# Patient Record
Sex: MALE | Race: WHITE | NOT HISPANIC OR LATINO | Employment: FULL TIME | URBAN - METROPOLITAN AREA
[De-identification: names, ages, dates, MRNs, and addresses within clinical notes are randomized per-mention and may not be internally consistent; named-entity substitution may affect disease eponyms.]

---

## 2020-06-12 ENCOUNTER — APPOINTMENT (OUTPATIENT)
Dept: RADIOLOGY | Facility: IMAGING CENTER | Age: 35
End: 2020-06-12
Attending: NURSE PRACTITIONER
Payer: COMMERCIAL

## 2020-06-12 ENCOUNTER — HOSPITAL ENCOUNTER (OUTPATIENT)
Facility: MEDICAL CENTER | Age: 35
End: 2020-06-12
Attending: NURSE PRACTITIONER
Payer: COMMERCIAL

## 2020-06-12 ENCOUNTER — OFFICE VISIT (OUTPATIENT)
Dept: URGENT CARE | Facility: CLINIC | Age: 35
End: 2020-06-12
Payer: COMMERCIAL

## 2020-06-12 VITALS
TEMPERATURE: 98 F | HEART RATE: 68 BPM | DIASTOLIC BLOOD PRESSURE: 80 MMHG | HEIGHT: 69 IN | SYSTOLIC BLOOD PRESSURE: 122 MMHG | OXYGEN SATURATION: 99 % | WEIGHT: 165 LBS | BODY MASS INDEX: 24.44 KG/M2 | RESPIRATION RATE: 16 BRPM

## 2020-06-12 DIAGNOSIS — R06.02 SHORTNESS OF BREATH: ICD-10-CM

## 2020-06-12 DIAGNOSIS — R05.9 COUGH: ICD-10-CM

## 2020-06-12 LAB — COVID ORDER STATUS COVID19: NORMAL

## 2020-06-12 PROCEDURE — 71046 X-RAY EXAM CHEST 2 VIEWS: CPT | Mod: TC | Performed by: NURSE PRACTITIONER

## 2020-06-12 PROCEDURE — 99204 OFFICE O/P NEW MOD 45 MIN: CPT | Performed by: NURSE PRACTITIONER

## 2020-06-12 RX ORDER — ALBUTEROL SULFATE 90 UG/1
2 AEROSOL, METERED RESPIRATORY (INHALATION) EVERY 6 HOURS PRN
Qty: 8.5 G | Refills: 0 | Status: SHIPPED | OUTPATIENT
Start: 2020-06-12 | End: 2021-12-31

## 2020-06-12 RX ORDER — BENZONATATE 100 MG/1
100 CAPSULE ORAL 3 TIMES DAILY PRN
Qty: 30 CAP | Refills: 0 | Status: SHIPPED | OUTPATIENT
Start: 2020-06-12 | End: 2020-07-06

## 2020-06-12 NOTE — PROGRESS NOTES
Chief Complaint   Patient presents with   • Shortness of Breath     x3 weeks, chest congestion and tighness, mild cough, unable to sleep, no fevers       HISTORY OF PRESENT ILLNESS: Patient is a 34 y.o. male who presents to urgent care today with complaints of shortness of breath for the past 3 weeks.  Admits to associated chest congestion, mild cough.  His symptoms seem to be worse at night.  He denies any chest pain, fever, chills, sore throat, malaise.  He does admit to history of seasonal allergies does not take any medication for such.  Denies any known exposure to COVID.    There are no active problems to display for this patient.      Allergies:Patient has no known allergies.    No current [facility]-ordered outpatient medications on file.     No current [facility]-ordered facility-administered medications on file.        History reviewed. No pertinent past medical history.    Social History     Tobacco Use   • Smoking status: Former Smoker     Types: Cigarettes     Last attempt to quit: 2015     Years since quittin.4   • Smokeless tobacco: Never Used   Substance Use Topics   • Alcohol use: Not on file   • Drug use: Yes     Frequency: 3.0 times per week     Types: Marijuana       No family status information on file.   History reviewed. No pertinent family history.    ROS:  Review of Systems   Constitutional: Negative for fever, chills, weight loss, malaise, and fatigue.   HENT: Negative for ear pain, nosebleeds, congestion, sore throat and neck pain.    Eyes: Negative for vision changes.   Neuro: Negative for headache, sensory changes, weakness, seizure, LOC.   Cardiovascular: Negative for chest pain, palpitations, orthopnea and leg swelling.   Respiratory: Positive for cough, chest congestion, shortness of breath.     Gastrointestinal: Negative for abdominal pain, nausea, vomiting or diarrhea.   Genitourinary: Negative for dysuria, urgency and frequency.  Musculoskeletal: Negative for falls, neck pain, back  "pain, joint pain, myalgias.   Skin: Negative for rash, diaphoresis.     Exam:  /80 (Patient Position: Sitting)   Pulse 68   Temp 36.7 °C (98 °F) (Temporal)   Resp 16   Ht 1.753 m (5' 9\")   Wt 74.8 kg (165 lb)   SpO2 99%   General: well-nourished, well-developed male in NAD  Head: normocephalic, atraumatic  Eyes: PERRLA, no conjunctival injection, acuity grossly intact, lids normal.  Ears: normal shape and symmetry, no tenderness, no discharge. External canals are without any significant edema or erythema. Tympanic membranes are without any inflammation, no effusion. Gross auditory acuity is intact.  Nose: symmetrical without tenderness, no discharge.  Mouth/Throat: reasonable hygiene, no erythema, exudates or tonsillar enlargement.  Neck: no masses, range of motion within normal limits, no tracheal deviation. No obvious thyroid enlargement.   Lymph: no cervical adenopathy. No supraclavicular adenopathy.   Neuro: alert and oriented. Cranial nerves 1-12 grossly intact. No sensory deficit.   Cardiovascular: regular rate and rhythm. No edema.  Pulmonary: no distress. Chest is symmetrical with respiration, no wheezes, crackles, or rhonchi.   Musculoskeletal: no clubbing, appropriate muscle tone, gait is stable.  Skin: warm, dry, intact, no clubbing, no cyanosis, no rashes.   Psych: appropriate mood, affect, judgement.     DX chest radiology reading \"No acute cardiopulmonary abnormality.\"        Assessment/Plan:  1. Shortness of breath  DX-CHEST-2 VIEWS    COVID/SARS COV-2 PCR    albuterol 108 (90 Base) MCG/ACT Aero Soln inhalation aerosol   2. Cough  benzonatate (TESSALON) 100 MG Cap         Patient presents with complaints of shortness of breath and cough for 3 weeks.  His chest x-ray is negative today.  I suspect his symptoms are allergy related, he is instructed to take a daily allergy medication.  Albuterol and Tessalon as needed.  Due to length of symptoms and complaints, we will also test for COVID.  " Self quarantine instructed until he is notified of results.  Supportive care, differential diagnoses, and indications for immediate follow-up discussed with patient.   Pathogenesis of diagnosis discussed including typical length and natural progression.   Instructed to return to clinic or nearest emergency department for any change in condition, further concerns, or worsening of symptoms.  Patient states understanding of the plan of care and discharge instructions.  Instructed to make an appointment, for follow up, with his primary care provider.        Please note that this dictation was created using voice recognition software. I have made every reasonable attempt to correct obvious errors, but I expect that there are errors of grammar and possibly content that I did not discover before finalizing the note.      CLYDE Pace.

## 2020-06-16 LAB
SARS-COV-2 RNA RESP QL NAA+PROBE: NOT DETECTED
SPECIMEN SOURCE: NORMAL

## 2020-07-06 ENCOUNTER — OFFICE VISIT (OUTPATIENT)
Dept: URGENT CARE | Facility: CLINIC | Age: 35
End: 2020-07-06
Payer: COMMERCIAL

## 2020-07-06 VITALS
RESPIRATION RATE: 16 BRPM | HEIGHT: 69 IN | SYSTOLIC BLOOD PRESSURE: 112 MMHG | WEIGHT: 165 LBS | HEART RATE: 61 BPM | DIASTOLIC BLOOD PRESSURE: 58 MMHG | BODY MASS INDEX: 24.44 KG/M2 | TEMPERATURE: 98.3 F | OXYGEN SATURATION: 97 %

## 2020-07-06 DIAGNOSIS — J32.9 RHINOSINUSITIS: ICD-10-CM

## 2020-07-06 PROCEDURE — 99214 OFFICE O/P EST MOD 30 MIN: CPT | Performed by: FAMILY MEDICINE

## 2020-07-06 RX ORDER — PREDNISONE 20 MG/1
40 TABLET ORAL EVERY MORNING
Qty: 12 TAB | Refills: 0 | Status: SHIPPED | OUTPATIENT
Start: 2020-07-06 | End: 2020-07-12

## 2020-07-06 RX ORDER — DOXYCYCLINE HYCLATE 100 MG
100 TABLET ORAL EVERY 12 HOURS
Qty: 20 TAB | Refills: 0 | Status: SHIPPED | OUTPATIENT
Start: 2020-07-06 | End: 2020-07-16

## 2020-07-06 ASSESSMENT — ENCOUNTER SYMPTOMS
COUGH: 1
SINUS PAIN: 1

## 2020-07-06 NOTE — PROGRESS NOTES
"Subjective:      Daniel Sebastian is a 34 y.o. male who presents with Nasal Congestion (1.5 months, congestion/ mucus, seen in june, not better)    - This is a pleasant and non toxic appearing 34 y.o. male with c/o 5-6 wks w/ sinus pain/pressure and nasal DC. No NVFC. Feels a lot of PND. No cp/sob, occasional cough but he says this is to clear the PND            ALLERGIES:  Patient has no known allergies.     PMH:  History reviewed. No pertinent past medical history.     PSH:  History reviewed. No pertinent surgical history.    MEDS:    Current Outpatient Medications:   •  predniSONE (DELTASONE) 20 MG Tab, Take 2 Tabs by mouth every morning for 6 days., Disp: 12 Tab, Rfl: 0  •  doxycycline (VIBRAMYCIN) 100 MG Tab, Take 1 Tab by mouth every 12 hours for 10 days., Disp: 20 Tab, Rfl: 0  •  albuterol 108 (90 Base) MCG/ACT Aero Soln inhalation aerosol, Inhale 2 Puffs by mouth every 6 hours as needed for Shortness of Breath., Disp: 8.5 g, Rfl: 0    ** I have documented what I find to be significant in regards to past medical, social, family and surgical history  in my HPI or under PMH/PSH/FH review section, otherwise it is contributory **             HPI    Review of Systems   HENT: Positive for congestion and sinus pain.    Respiratory: Positive for cough.    All other systems reviewed and are negative.         Objective:     /58   Pulse 61   Temp 36.8 °C (98.3 °F) (Temporal)   Resp 16   Ht 1.753 m (5' 9\")   Wt 74.8 kg (165 lb)   SpO2 97%   BMI 24.37 kg/m²      Physical Exam  Vitals signs and nursing note reviewed.   Constitutional:       General: He is not in acute distress.     Appearance: He is well-developed. He is not diaphoretic.   HENT:      Head: Normocephalic and atraumatic.      Nose: Congestion and rhinorrhea present.      Mouth/Throat:      Mouth: Mucous membranes are moist.      Pharynx: Oropharyngeal exudate and posterior oropharyngeal erythema present.   Eyes:      General: No scleral icterus.     " Conjunctiva/sclera: Conjunctivae normal.   Cardiovascular:      Heart sounds: Normal heart sounds. No murmur.   Pulmonary:      Effort: Pulmonary effort is normal. No respiratory distress.      Breath sounds: Normal breath sounds.   Skin:     Coloration: Skin is not pale.      Findings: No rash.   Neurological:      Mental Status: He is alert.      Motor: No abnormal muscle tone.   Psychiatric:         Mood and Affect: Mood normal.         Behavior: Behavior normal.         Judgment: Judgment normal.                 Assessment/Plan:           1. Rhinosinusitis  predniSONE (DELTASONE) 20 MG Tab    doxycycline (VIBRAMYCIN) 100 MG Tab       - rest  - E.R. precautions discussed     Dx & d/c instructions discussed w/ patient and/or family members.     Follow up with PCP (or UC if PCP is unavailable) in 2-3 days to make sure improving and no further additional treatment needed, ER if not improving or feeling/getting worse.    Any realistic and/or common medication side effects that may have been given today(i.e. Rash, GI upset/constipation, sedation, elevation of BP or blood sugars) reviewed.     Patient left in stable condition      reviewed if narcotics given

## 2020-08-18 ENCOUNTER — OFFICE VISIT (OUTPATIENT)
Dept: URGENT CARE | Facility: CLINIC | Age: 35
End: 2020-08-18
Payer: COMMERCIAL

## 2020-08-18 VITALS
HEART RATE: 95 BPM | HEIGHT: 69 IN | WEIGHT: 160 LBS | DIASTOLIC BLOOD PRESSURE: 66 MMHG | OXYGEN SATURATION: 99 % | TEMPERATURE: 98.4 F | SYSTOLIC BLOOD PRESSURE: 120 MMHG | BODY MASS INDEX: 23.7 KG/M2

## 2020-08-18 DIAGNOSIS — L74.0 HEAT RASH: ICD-10-CM

## 2020-08-18 PROCEDURE — 99214 OFFICE O/P EST MOD 30 MIN: CPT | Performed by: NURSE PRACTITIONER

## 2020-08-18 RX ORDER — TRIAMCINOLONE ACETONIDE 1 MG/G
1 CREAM TOPICAL 2 TIMES DAILY
Qty: 15 G | Refills: 0 | Status: SHIPPED | OUTPATIENT
Start: 2020-08-18 | End: 2020-08-25

## 2020-08-18 ASSESSMENT — ENCOUNTER SYMPTOMS
FATIGUE: 0
FEVER: 0

## 2020-08-19 NOTE — PROGRESS NOTES
Subjective:     Daniel Sebastian is a 34 y.o. male who presents for Rash (both thighs, itches, noticed today)      Rash to inner thighs. Noticed it after his run today, possibly chaffed. Concerns for possible STD due to proximity. No history of similar rash. No new soaps or detergents. No pain. Applied a moisturizer cream. Possible hx of eczema, dry skin and red bumps to extremities. Hx of molluscum. Recently moved here, does not have a current PCP.     Rash  This is a new problem. The current episode started today. The problem is unchanged. The affected locations include the groin. The rash is characterized by redness and itchiness. He was exposed to nothing. Pertinent negatives include no fatigue, fever or joint pain. Past treatments include moisturizer. The treatment provided no relief.       History reviewed. No pertinent past medical history.    History reviewed. No pertinent surgical history.    Social History     Socioeconomic History   • Marital status: Single     Spouse name: Not on file   • Number of children: Not on file   • Years of education: Not on file   • Highest education level: Not on file   Occupational History   • Not on file   Social Needs   • Financial resource strain: Not on file   • Food insecurity     Worry: Not on file     Inability: Not on file   • Transportation needs     Medical: Not on file     Non-medical: Not on file   Tobacco Use   • Smoking status: Former Smoker     Types: Cigarettes     Quit date: 2015     Years since quittin.6   • Smokeless tobacco: Never Used   Substance and Sexual Activity   • Alcohol use: Yes     Comment: social   • Drug use: Yes     Frequency: 3.0 times per week     Types: Marijuana   • Sexual activity: Not on file   Lifestyle   • Physical activity     Days per week: Not on file     Minutes per session: Not on file   • Stress: Not on file   Relationships   • Social connections     Talks on phone: Not on file     Gets together: Not on file     Attends  "Confucianist service: Not on file     Active member of club or organization: Not on file     Attends meetings of clubs or organizations: Not on file     Relationship status: Not on file   • Intimate partner violence     Fear of current or ex partner: Not on file     Emotionally abused: Not on file     Physically abused: Not on file     Forced sexual activity: Not on file   Other Topics Concern   • Not on file   Social History Narrative   • Not on file        History reviewed. No pertinent family history.     No Known Allergies    Review of Systems   Constitutional: Negative for fatigue and fever.   Genitourinary: Negative for dysuria.   Musculoskeletal: Negative for joint pain.   Skin: Positive for itching and rash.   All other systems reviewed and are negative.       Objective:   /66   Pulse 95   Temp 36.9 °C (98.4 °F) (Temporal)   Ht 1.753 m (5' 9\")   Wt 72.6 kg (160 lb)   SpO2 99%   BMI 23.63 kg/m²     Physical Exam  Vitals signs reviewed.   Constitutional:       General: He is not in acute distress.     Appearance: He is well-developed.   HENT:      Head: Normocephalic and atraumatic.      Right Ear: External ear normal.      Left Ear: External ear normal.      Nose: Nose normal.   Eyes:      Conjunctiva/sclera: Conjunctivae normal.   Neck:      Musculoskeletal: Normal range of motion.   Cardiovascular:      Rate and Rhythm: Normal rate.   Pulmonary:      Effort: Pulmonary effort is normal.   Genitourinary:     Penis: Normal. No erythema, discharge, swelling or lesions.    Musculoskeletal: Normal range of motion.   Skin:     General: Skin is warm and dry.      Findings: Rash present. Rash is macular. Rash is not pustular or vesicular.             Comments: Small macular erythremic rash to proximal/medial bilateral thighs, same area bilaterally, approx 3 cm.    Neurological:      Mental Status: He is alert and oriented to person, place, and time.      GCS: GCS eye subscore is 4. GCS verbal subscore is 5. " GCS motor subscore is 6.   Psychiatric:         Speech: Speech normal.         Behavior: Behavior normal.         Thought Content: Thought content normal.         Judgment: Judgment normal.         Assessment/Plan:   1. Heat rash  - triamcinolone acetonide (KENALOG) 0.1 % Cream; Apply 1 Application to affected area(s) 2 times a day for 7 days.  Dispense: 15 g; Refill: 0  - REFERRAL TO FOLLOW-UP WITH PRIMARY CARE  -Wear breathable clothing (such as cotton) that does not occlude the skin.  -Keep area dry and cool.  -Monitor for changes or signs of infection.    Discussed heat rash vs contact dermatitis. Presentation not consistent with an STD. Monitor for progression of rash. Discussed applying a topical corticosteroid to decrease inflammation.     Differential diagnosis, natural history, supportive care, and indications for immediate follow-up discussed.

## 2020-08-19 NOTE — PATIENT INSTRUCTIONS
Heat Rash, Adult    Heat rash is an itchy rash of little red bumps that often occurs during hot, humid weather. Heat rash is also called prickly heat or miliaria.  Heat rash usually affects:  · Armpits.  · Elbows.  · Groin.  · Neck.  · The area underneath the breasts.  · Shoulders.  · Chest.  What are the causes?  This condition is caused by blocked sweat ducts. When sweat is trapped under the skin, it spreads into surrounding tissues and causes a rash of red bumps.  What increases the risk?  This condition is more likely to develop in people who:  · Are overdressed in hot, humid weather.  · Wear clothing that rubs against the skin.  · Are active in hot, humid weather.  · Sweat a lot.  · Are not used to hot, humid weather.  What are the signs or symptoms?  Symptoms of this condition include:  · Small red bumps that are itchy or prickly.  · Very little sweating or no sweating in the affected area.  How is this diagnosed?  This condition is diagnosed based on your symptoms and medical history, as well as a physical exam.  How is this treated?  Moving to a cool, dry place is the best treatment for heat rash. Treatment may also include medicines, such as:  · Corticosteroid creams for skin irritation.  · Antibiotic medicines, if the rash becomes infected.  Follow these instructions at home:  Skin care  · Keep the affected area dry.  · Do not apply ointments or creams that contain mineral oil or petroleum ingredients to your skin. These can make the condition worse.  · Apply cool compresses to the affected areas.  · Do not scratch your skin.  · Do not take hot showers or baths.  General instructions  · Take over-the-counter and prescription medicines only as told by your health care provider.  · If you were prescribed an antibiotic, take it as told by your health care provider. Do not stop taking it even if your condition improves.  · Stay in a cool room as much as possible. Use an air conditioner or fan, if  possible.  · Do not wear tight clothes. Wear comfortable, loose-fitting clothing.  · Keep all follow-up visits as told by your health care provider. This is important.  Contact a health care provider if:  · You have a fever.  · Your rash does not go away after 3-4 days.  · Your rash gets worse or it is very itchy.  · Your rash has pus or fluid coming from it.  Get help right away if:  · You are dizzy or nauseated.  · You feel confused.  · You have trouble breathing.  · You have chest pain.  · You have muscle cramps or contractions.  · You faint.  Summary  · Heat rash is an itchy rash of little red bumps that often occurs during hot, humid weather.  · Symptoms of heat rash include small red bumps that are itchy or prickly and very little or no sweating in the affected area.  · This condition is diagnosed based on your symptoms and medical history, as well as a physical exam.  · Moving to a cool, dry place is the best treatment for heat rash.  · Do not wear tight clothes. Wear comfortable, loose-fitting clothing.  This information is not intended to replace advice given to you by your health care provider. Make sure you discuss any questions you have with your health care provider.  Document Released: 12/06/2010 Document Revised: 11/30/2018 Document Reviewed: 02/28/2018  [employer] Patient Education © 2020 [employer]    Contact Dermatitis  Dermatitis is redness, soreness, and swelling (inflammation) of the skin. Contact dermatitis is a reaction to certain substances that touch the skin. Many different substances can cause contact dermatitis. There are two types of contact dermatitis:  · Irritant contact dermatitis. This type is caused by something that irritates your skin, such as having dry hands from washing them too often with soap. This type does not require previous exposure to the substance for a reaction to occur. This is the most common type.  · Allergic contact dermatitis. This type is caused by a substance  that you are allergic to, such as poison ivy. This type occurs when you have been exposed to the substance (allergen) and develop a sensitivity to it. Dermatitis may develop soon after your first exposure to the allergen, or it may not develop until the next time you are exposed and every time thereafter.  What are the causes?  Irritant contact dermatitis is most commonly caused by exposure to:  · Makeup.  · Soaps.  · Detergents.  · Bleaches.  · Acids.  · Metal salts, such as nickel.  Allergic contact dermatitis is most commonly caused by exposure to:  · Poisonous plants.  · Chemicals.  · Jewelry.  · Latex.  · Medicines.  · Preservatives in products, such as clothing.  What increases the risk?  You are more likely to develop this condition if you have:  · A job that exposes you to irritants or allergens.  · Certain medical conditions, such as asthma or eczema.  What are the signs or symptoms?  Symptoms of this condition may occur on your body anywhere the irritant has touched you or is touched by you.  · Symptoms include:  ? Dryness or flaking.  ? Redness.  ? Cracks.  ? Itching.  ? Pain or a burning feeling.  ? Blisters.  ? Drainage of small amounts of blood or clear fluid from skin cracks.  With allergic contact dermatitis, there may also be swelling in areas such as the eyelids, mouth, or genitals.  How is this diagnosed?  This condition is diagnosed with a medical history and physical exam.  · A patch skin test may be performed to help determine the cause.  · If the condition is related to your job, you may need to see an occupational medicine specialist.  How is this treated?  This condition is treated by checking for the cause of the reaction and protecting your skin from further contact. Treatment may also include:  · Steroid creams or ointments. Oral steroid medicines may be needed in more severe cases.  · Antibiotic medicines or antibacterial ointments, if a skin infection is present.  · Antihistamine lotion  or an antihistamine taken by mouth to ease itching.  · A bandage (dressing).  Follow these instructions at home:  Skin care  · Moisturize your skin as needed.  · Apply cool compresses to the affected areas.  · Try applying baking soda paste to your skin. Stir water into baking soda until it reaches a paste-like consistency.  · Do not scratch your skin, and avoid friction to the affected area.  · Avoid the use of soaps, perfumes, and dyes.  Medicines  · Take or apply over-the-counter and prescription medicines only as told by your health care provider.  · If you were prescribed an antibiotic medicine, take or apply the antibiotic as told by your health care provider. Do not stop using the antibiotic even if your condition improves.  Bathing  · Try taking a bath with:  ? Epsom salts. Follow the instructions on the packaging. You can get these at your local pharmacy or grocery store.  ? Baking soda. Pour a small amount into the bath as directed by your health care provider.  ? Colloidal oatmeal. Follow the instructions on the packaging. You can get this at your local pharmacy or grocery store.  · Bathe less frequently, such as every other day.  · Bathe in lukewarm water. Avoid using hot water.  Bandage care  · If you were given a bandage (dressing), change it as told by your health care provider.  · Wash your hands with soap and water before and after you change your dressing. If soap and water are not available, use hand .  General instructions  · Avoid the substance that caused your reaction. If you do not know what caused it, keep a journal to try to track what caused it. Write down:  ? What you eat.  ? What cosmetic products you use.  ? What you drink.  ? What you wear in the affected area. This includes jewelry.  · Check the affected areas every day for signs of infection. Check for:  ? More redness, swelling, or pain.  ? More fluid or blood.  ? Warmth.  ? Pus or a bad smell.  · Keep all follow-up visits  as told by your health care provider. This is important.  Contact a health care provider if:  · Your condition does not improve with treatment.  · Your condition gets worse.  · You have signs of infection such as swelling, tenderness, redness, soreness, or warmth in the affected area.  · You have a fever.  · You have new symptoms.  Get help right away if:  · You have a severe headache, neck pain, or neck stiffness.  · You vomit.  · You feel very sleepy.  · You notice red streaks coming from the affected area.  · Your bone or joint underneath the affected area becomes painful after the skin has healed.  · The affected area turns darker.  · You have difficulty breathing.  Summary  · Dermatitis is redness, soreness, and swelling (inflammation) of the skin. Contact dermatitis is a reaction to certain substances that touch the skin.  · Symptoms of this condition may occur on your body anywhere the irritant has touched you or is touched by you.  · This condition is treated by figuring out what caused the reaction and protecting your skin from further contact. Treatment may also include medicines and skin care.  · Avoid the substance that caused your reaction. If you do not know what caused it, keep a journal to try to track what caused it.  · Contact a health care provider if your condition gets worse or you have signs of infection such as swelling, tenderness, redness, soreness, or warmth in the affected area.  This information is not intended to replace advice given to you by your health care provider. Make sure you discuss any questions you have with your health care provider.  Document Released: 12/15/2001 Document Revised: 04/08/2020 Document Reviewed: 07/03/2019  [employer] Patient Education © 2020 [employer]

## 2020-08-28 ENCOUNTER — OFFICE VISIT (OUTPATIENT)
Dept: URGENT CARE | Facility: CLINIC | Age: 35
End: 2020-08-28
Payer: COMMERCIAL

## 2020-08-28 VITALS
BODY MASS INDEX: 23.7 KG/M2 | HEIGHT: 69 IN | OXYGEN SATURATION: 94 % | DIASTOLIC BLOOD PRESSURE: 74 MMHG | RESPIRATION RATE: 15 BRPM | HEART RATE: 64 BPM | WEIGHT: 160 LBS | TEMPERATURE: 96.8 F | SYSTOLIC BLOOD PRESSURE: 124 MMHG

## 2020-08-28 DIAGNOSIS — A64 STI (SEXUALLY TRANSMITTED INFECTION): ICD-10-CM

## 2020-08-28 PROCEDURE — 99214 OFFICE O/P EST MOD 30 MIN: CPT | Performed by: PHYSICIAN ASSISTANT

## 2020-08-28 ASSESSMENT — ENCOUNTER SYMPTOMS
FATIGUE: 0
ABDOMINAL PAIN: 0
FLANK PAIN: 0
EYE REDNESS: 0
CHILLS: 0
SWOLLEN GLANDS: 0
MYALGIAS: 0
SORE THROAT: 0
FEVER: 0
EYE DISCHARGE: 0
HEADACHES: 0
NAUSEA: 0
JOINT SWELLING: 0

## 2020-08-28 NOTE — PROGRESS NOTES
"Subjective:   Daniel Sebastian is a 34 y.o. male who presents for Exposure to STD (heat rash,but mainly concern of the std,  no symptoms just want to get checked )      Sexually Transmitted Diseases  Chronicity: Patient recently had unprotected sex with a new partner.  He currently does not have any signs or symptoms of an STD but would like to be tested. Pertinent negatives include no abdominal pain, chills, congestion, fatigue, fever, headaches, joint swelling, myalgias, nausea, rash, sore throat, swollen glands or urinary symptoms.         Review of Systems   Constitutional: Negative for chills, fatigue, fever and malaise/fatigue.   HENT: Negative for congestion and sore throat.    Eyes: Negative for discharge and redness.   Gastrointestinal: Negative for abdominal pain and nausea.   Genitourinary: Negative for dysuria, flank pain, frequency, hematuria and urgency.        Denies any penile discharge or rashes or lesions.   Musculoskeletal: Negative for joint swelling and myalgias.   Skin: Negative for rash.   Neurological: Negative for headaches.       Medications:    • albuterol Aers    Allergies: Patient has no known allergies.    Problem List: Daniel Sebastian does not have a problem list on file.    Surgical History:  No past surgical history on file.    Past Social Hx: Daniel Sebastian  reports that he quit smoking about 5 years ago. His smoking use included cigarettes. He has never used smokeless tobacco. He reports current alcohol use. He reports current drug use. Frequency: 3.00 times per week. Drug: Marijuana.     Past Family Hx:  Daniel Sebastian family history is not on file.     Problem list, medications, and allergies reviewed by myself today in Epic.     Objective:     /74   Pulse 64   Temp 36 °C (96.8 °F) (Temporal)   Resp 15   Ht 1.753 m (5' 9\")   Wt 72.6 kg (160 lb)   SpO2 94%   BMI 23.63 kg/m²     Physical Exam  Constitutional:       General: He is not in acute distress.     Appearance: Normal appearance. " He is not ill-appearing, toxic-appearing or diaphoretic.   HENT:      Head: Normocephalic and atraumatic.      Right Ear: Tympanic membrane, ear canal and external ear normal.      Left Ear: Tympanic membrane, ear canal and external ear normal.      Nose: Nose normal. No congestion or rhinorrhea.      Mouth/Throat:      Mouth: Mucous membranes are moist.      Pharynx: No oropharyngeal exudate or posterior oropharyngeal erythema.   Eyes:      Conjunctiva/sclera: Conjunctivae normal.   Neck:      Musculoskeletal: Normal range of motion. No muscular tenderness.   Cardiovascular:      Rate and Rhythm: Normal rate and regular rhythm.      Pulses: Normal pulses.      Heart sounds: Normal heart sounds.   Pulmonary:      Effort: Pulmonary effort is normal.      Breath sounds: Normal breath sounds. No wheezing.   Abdominal:      Palpations: Abdomen is soft.      Tenderness: There is no abdominal tenderness.      Hernia: There is no hernia in the left inguinal area or right inguinal area.   Genitourinary:     Pubic Area: No rash or pubic lice.       Penis: Normal. No swelling or lesions.       Scrotum/Testes: Normal.         Right: Tenderness or swelling not present.         Left: Tenderness or swelling not present.      Epididymis:      Right: Normal.      Left: Normal.   Lymphadenopathy:      Cervical: No cervical adenopathy.   Skin:     General: Skin is warm and dry.      Capillary Refill: Capillary refill takes less than 2 seconds.   Neurological:      Mental Status: He is alert.   Psychiatric:         Mood and Affect: Mood normal.         Thought Content: Thought content normal.           Assessment/Plan:     Diagnosis and associated orders:     1. STI (sexually transmitted infection)  Chlamydia/GC PCR Urine Or Swab    HSV (HERPES SIMPLEX VIRUS) BY PCR (BLOOD)    RPR    HIV AG/AB COMBO ASSAY SCREENING      Comments/MDM:     • I will follow-up with the patient's results once available.  • Refrain from sexual intercourse  until results are finalized.  • Patient is currently asymptomatic no prophylactic treatment was necessary at this time.  • Discussed potential red flag symptoms and signs of infection with the patient today.  If any of these present return to the clinic for further work-up and evaluation.  • Please call with any questions or concerns.           Differential diagnosis, natural history, supportive care, and indications for immediate follow-up discussed.    Advised the patient to follow-up with the primary care physician for recheck, reevaluation, and consideration of further management.    Please note that this dictation was created using voice recognition software. I have made reasonable attempt to correct obvious errors, but I expect that there are errors of grammar and possibly content that I did not discover before finalizing the note.    This note was electronically signed by HARLEY Rincon PA-C

## 2020-11-29 ENCOUNTER — OFFICE VISIT (OUTPATIENT)
Dept: URGENT CARE | Facility: CLINIC | Age: 35
End: 2020-11-29
Payer: COMMERCIAL

## 2020-11-29 ENCOUNTER — HOSPITAL ENCOUNTER (OUTPATIENT)
Facility: MEDICAL CENTER | Age: 35
End: 2020-11-29
Attending: NURSE PRACTITIONER
Payer: COMMERCIAL

## 2020-11-29 VITALS
HEIGHT: 69 IN | DIASTOLIC BLOOD PRESSURE: 68 MMHG | TEMPERATURE: 97.7 F | SYSTOLIC BLOOD PRESSURE: 126 MMHG | HEART RATE: 76 BPM | WEIGHT: 160 LBS | OXYGEN SATURATION: 100 % | BODY MASS INDEX: 23.7 KG/M2 | RESPIRATION RATE: 16 BRPM

## 2020-11-29 DIAGNOSIS — R36.1 BLOOD IN SEMEN: ICD-10-CM

## 2020-11-29 DIAGNOSIS — N48.89 PENILE IRRITATION: ICD-10-CM

## 2020-11-29 LAB
APPEARANCE UR: CLEAR
BILIRUB UR STRIP-MCNC: NEGATIVE MG/DL
COLOR UR AUTO: YELLOW
GLUCOSE UR STRIP.AUTO-MCNC: NEGATIVE MG/DL
KETONES UR STRIP.AUTO-MCNC: NEGATIVE MG/DL
LEUKOCYTE ESTERASE UR QL STRIP.AUTO: NEGATIVE
NITRITE UR QL STRIP.AUTO: NEGATIVE
PH UR STRIP.AUTO: 6 [PH] (ref 5–8)
PROT UR QL STRIP: NEGATIVE MG/DL
RBC UR QL AUTO: NEGATIVE
SP GR UR STRIP.AUTO: 1.03
UROBILINOGEN UR STRIP-MCNC: 0.2 MG/DL

## 2020-11-29 PROCEDURE — 81002 URINALYSIS NONAUTO W/O SCOPE: CPT | Performed by: NURSE PRACTITIONER

## 2020-11-29 PROCEDURE — 99213 OFFICE O/P EST LOW 20 MIN: CPT | Performed by: NURSE PRACTITIONER

## 2020-11-29 PROCEDURE — 87491 CHLMYD TRACH DNA AMP PROBE: CPT

## 2020-11-29 PROCEDURE — 87591 N.GONORRHOEAE DNA AMP PROB: CPT

## 2020-11-30 DIAGNOSIS — N48.89 PENILE IRRITATION: ICD-10-CM

## 2020-11-30 DIAGNOSIS — R36.1 BLOOD IN SEMEN: ICD-10-CM

## 2020-11-30 ASSESSMENT — ENCOUNTER SYMPTOMS
FLANK PAIN: 0
VOMITING: 0
NAUSEA: 0
CHILLS: 0
SHORTNESS OF BREATH: 0
MYALGIAS: 0
FEVER: 0
EYE REDNESS: 0
DIZZINESS: 0
SORE THROAT: 0

## 2020-11-30 NOTE — PROGRESS NOTES
"Subjective:   Daniel Sebastian is a 35 y.o. male who presents for Penile Discharge (blood in semen, no pain )      HPI  Patient is a 35-year-old male who presents to the urgent care for evaluation of blood in his semen x1 day.  Patient states that this morning after ejaculation he noticed a moderate amount of blood.  He has no pain.  He says since then he may have some irritation however does not know if this is psychosomatic.  He has had unprotected sex in the last 2 weeks with a new sexual partner.  Unknown STD exposure.  He has no other associated symptoms.  No fever, chills, no exposure to Covid.  Review of Systems   Constitutional: Negative for chills and fever.   HENT: Negative for sore throat.    Eyes: Negative for redness.   Respiratory: Negative for shortness of breath.    Cardiovascular: Negative for chest pain.   Gastrointestinal: Negative for nausea and vomiting.   Genitourinary: Negative for dysuria, flank pain, frequency, hematuria and urgency.        Penile irritation, blood in semen      Musculoskeletal: Negative for myalgias.   Skin: Negative for rash.   Neurological: Negative for dizziness.       Medications:    • albuterol Aers    Allergies: Patient has no known allergies.    Problem List: Daniel Sebastian does not have a problem list on file.    Surgical History:  No past surgical history on file.    Past Social Hx: Daniel Sebastian  reports that he quit smoking about 5 years ago. His smoking use included cigarettes. He has never used smokeless tobacco. He reports current alcohol use. He reports current drug use. Frequency: 3.00 times per week. Drug: Marijuana.     Past Family Hx:  Daniel Sebastian family history is not on file.     Problem list, medications, and allergies reviewed by myself today in Epic.     Objective:     /68 (Patient Position: Sitting)   Pulse 76   Temp 36.5 °C (97.7 °F) (Temporal)   Resp 16   Ht 1.753 m (5' 9\")   Wt 72.6 kg (160 lb)   SpO2 100%   BMI 23.63 kg/m²     Physical " Exam  Vitals signs and nursing note reviewed.   Constitutional:       General: He is not in acute distress.     Appearance: He is well-developed.   HENT:      Head: Normocephalic and atraumatic.      Right Ear: External ear normal.      Left Ear: External ear normal.      Nose: Nose normal.      Mouth/Throat:      Mouth: Mucous membranes are moist.   Eyes:      Conjunctiva/sclera: Conjunctivae normal.   Cardiovascular:      Rate and Rhythm: Normal rate.   Pulmonary:      Effort: Pulmonary effort is normal. No respiratory distress.      Breath sounds: Normal breath sounds.   Abdominal:      General: There is no distension.   Genitourinary:     Penis: Erythema present. No discharge or swelling.       Testes: Normal.         Right: Tenderness or swelling not present.         Left: Tenderness or swelling not present.      Epididymis:      Right: Normal.      Left: Normal.       Musculoskeletal: Normal range of motion.   Skin:     General: Skin is warm and dry.   Neurological:      General: No focal deficit present.      Mental Status: He is alert and oriented to person, place, and time. Mental status is at baseline.      Gait: Gait (gait at baseline) normal.   Psychiatric:         Judgment: Judgment normal.         Assessment/Plan:     Diagnosis and associated orders:     1. Blood in semen  CHLAMYDIA/GC PCR URINE OR SWAB    POCT Urinalysis    REFERRAL TO UROLOGY   2. Penile irritation  CHLAMYDIA/GC PCR URINE OR SWAB    POCT Urinalysis    REFERRAL TO UROLOGY      Comments/MDM:     Results for orders placed or performed in visit on 11/29/20   POCT Urinalysis   Result Value Ref Range    POC Color yellow Negative    POC Appearance clear Negative    POC Leukocyte Esterase negative Negative    POC Nitrites negative Negative    POC Urobiligen 0.2 Negative (0.2) mg/dL    POC Protein negative Negative mg/dL    POC Urine PH 6.0 5.0 - 8.0    POC Blood negative Negative    POC Specific Gravity 1.030 <1.005 - >1.030    POC Ketones  negative Negative mg/dL    POC Bilirubin negative Negative mg/dL    POC Glucose negative Negative mg/dL •   Patient is a 35-year-old male present with the stated above, urinalysis negative.  Patient has had unprotected sex in the last 2 weeks discussed differentials.  Patient will be screened for STIs he would prefer not to be treated until labs are confirmed.Differential diagnosis, natural history, supportive care, and indications for immediate follow-up discussed.  Advised patient I will place urology follow-up for further evaluation management if symptoms persist with negative STI results.  • Differential diagnosis, natural history, supportive care, and indications for immediate follow-up discussed.   •          Please note that this dictation was created using voice recognition software. I have made a reasonable attempt to correct obvious errors, but I expect that there are errors of grammar and possibly content that I did not discover before finalizing the note.    This note was electronically signed by Carlos HOLBROOK

## 2020-12-01 LAB
C TRACH DNA SPEC QL NAA+PROBE: NEGATIVE
N GONORRHOEA DNA SPEC QL NAA+PROBE: NEGATIVE
SPECIMEN SOURCE: NORMAL

## 2021-01-08 ENCOUNTER — HOSPITAL ENCOUNTER (OUTPATIENT)
Facility: MEDICAL CENTER | Age: 36
End: 2021-01-08
Attending: NURSE PRACTITIONER
Payer: COMMERCIAL

## 2021-01-08 ENCOUNTER — OFFICE VISIT (OUTPATIENT)
Dept: URGENT CARE | Facility: CLINIC | Age: 36
End: 2021-01-08
Payer: COMMERCIAL

## 2021-01-08 VITALS
HEART RATE: 69 BPM | DIASTOLIC BLOOD PRESSURE: 84 MMHG | SYSTOLIC BLOOD PRESSURE: 124 MMHG | OXYGEN SATURATION: 98 % | HEIGHT: 69 IN | RESPIRATION RATE: 16 BRPM | TEMPERATURE: 97.2 F | WEIGHT: 172 LBS | BODY MASS INDEX: 25.48 KG/M2

## 2021-01-08 DIAGNOSIS — R09.81 NASAL CONGESTION: ICD-10-CM

## 2021-01-08 DIAGNOSIS — R52 GENERALIZED BODY ACHES: ICD-10-CM

## 2021-01-08 DIAGNOSIS — Z20.822 EXPOSURE TO COVID-19 VIRUS: Primary | ICD-10-CM

## 2021-01-08 PROCEDURE — 99214 OFFICE O/P EST MOD 30 MIN: CPT | Mod: CS | Performed by: NURSE PRACTITIONER

## 2021-01-08 PROCEDURE — U0003 INFECTIOUS AGENT DETECTION BY NUCLEIC ACID (DNA OR RNA); SEVERE ACUTE RESPIRATORY SYNDROME CORONAVIRUS 2 (SARS-COV-2) (CORONAVIRUS DISEASE [COVID-19]), AMPLIFIED PROBE TECHNIQUE, MAKING USE OF HIGH THROUGHPUT TECHNOLOGIES AS DESCRIBED BY CMS-2020-01-R: HCPCS

## 2021-01-08 PROCEDURE — U0005 INFEC AGEN DETEC AMPLI PROBE: HCPCS

## 2021-01-08 ASSESSMENT — ENCOUNTER SYMPTOMS
MEMORY LOSS: 0
SHORTNESS OF BREATH: 0
WHEEZING: 0
TINGLING: 0
DIARRHEA: 0
ORTHOPNEA: 0
SORE THROAT: 0
FEVER: 0
HEADACHES: 0
HEARTBURN: 0
CHILLS: 0
NAUSEA: 0
COUGH: 0
PALPITATIONS: 0
VOMITING: 0
BACK PAIN: 0
MYALGIAS: 1
SINUS PAIN: 0
CONSTIPATION: 0
DIZZINESS: 0

## 2021-01-08 NOTE — PROGRESS NOTES
"Subjective:      Daniel Sebastian is a 35 y.o. male who presents with Nasal Congestion (x since sunday - exposed to COVID)          Patient presents to urgent care with complaint of nasal congestion and body aches x3 days.  Patient states he was exposed to COVID-19 at a New Year's party he went to.  Multiple people at the party have tested positive.  Patient states he did have diarrhea last week but that has resolved.  He denies any change in his sense of taste or smell.  He denies nausea or vomiting.  He denies chest pain or shortness of breath.  He denies cough or sore throat.  URI   This is a new problem. Episode onset: The past 3 days. The problem has been unchanged. There has been no fever. Associated symptoms include congestion. Pertinent negatives include no chest pain, coughing, diarrhea, ear pain, headaches, joint pain, nausea, rash, sinus pain, sore throat, vomiting or wheezing. Associated symptoms comments: Body aches. He has tried NSAIDs (OTC Mucinex) for the symptoms. The treatment provided mild relief.       Review of Systems   Constitutional: Negative for chills, fever and malaise/fatigue.   HENT: Positive for congestion. Negative for ear pain, sinus pain and sore throat.    Respiratory: Negative for cough, shortness of breath and wheezing.    Cardiovascular: Negative for chest pain, palpitations, orthopnea and leg swelling.   Gastrointestinal: Negative for constipation, diarrhea, heartburn, nausea and vomiting.   Musculoskeletal: Positive for myalgias. Negative for back pain and joint pain.   Skin: Negative for rash.   Neurological: Negative for dizziness, tingling and headaches.   Psychiatric/Behavioral: Negative for memory loss and suicidal ideas.   All other systems reviewed and are negative.         Objective:     /84 (BP Location: Left arm, Patient Position: Sitting, BP Cuff Size: Adult)   Pulse 69   Temp 36.2 °C (97.2 °F) (Temporal)   Resp 16   Ht 1.753 m (5' 9\")   Wt 78 kg (172 lb)   " SpO2 98%   BMI 25.40 kg/m²      Physical Exam  Vitals signs reviewed.   Constitutional:       General: He is not in acute distress.     Appearance: Normal appearance. He is not ill-appearing.   HENT:      Head: Normocephalic.      Right Ear: Tympanic membrane, ear canal and external ear normal.      Left Ear: Tympanic membrane, ear canal and external ear normal.      Nose: Congestion present. No rhinorrhea.      Mouth/Throat:      Mouth: Mucous membranes are moist.      Pharynx: Posterior oropharyngeal erythema present. No oropharyngeal exudate.   Eyes:      Extraocular Movements: Extraocular movements intact.      Conjunctiva/sclera: Conjunctivae normal.      Pupils: Pupils are equal, round, and reactive to light.   Neck:      Musculoskeletal: Normal range of motion and neck supple. No muscular tenderness.   Cardiovascular:      Rate and Rhythm: Normal rate and regular rhythm.      Pulses: Normal pulses.      Heart sounds: Normal heart sounds. No murmur.   Pulmonary:      Effort: Pulmonary effort is normal. No respiratory distress.      Breath sounds: Normal breath sounds. No wheezing, rhonchi or rales.   Abdominal:      General: Abdomen is flat.      Palpations: Abdomen is soft.   Musculoskeletal: Normal range of motion.   Lymphadenopathy:      Cervical: No cervical adenopathy.   Skin:     General: Skin is warm and dry.      Capillary Refill: Capillary refill takes less than 2 seconds.   Neurological:      Mental Status: He is alert and oriented to person, place, and time.   Psychiatric:         Mood and Affect: Mood normal.         Behavior: Behavior normal.                 Assessment/Plan:        1. Exposure to COVID-19 virus  COVID/SARS CoV-2 PCR   2. Generalized body aches  COVID/SARS CoV-2 PCR   3. Nasal congestion  COVID/SARS CoV-2 PCR       May take over-the-counter Ibuprofen 400-600 mg every 8 hours as needed for pain    Await Covid results.    Discussed with patient that symptoms are suspicious for  Covid-19 infection vs other viral illness.  Vitals are stable at this time.  Patient is advised to self isolate and provided with self isolation precautions AVS information.  Discussed when to return to urgent care or ER including for worsening shortness of breath.  Patient verbalized understanding and has no additional questions.    Plan of care, medications and treatments reviewed with patient and or guardian.  Patient and or guardian voices understanding and agrees with the instructions provided. After visit summary reviewed with patient. Patient and or guardian understand the parameters for reevaluation and ER precautions discussed.     Please note that this dictation was created using voice recognition software. I have made every reasonable attempt to correct obvious errors, but I expect that there are errors of grammar and possibly content that I did not discover before finalizing the note.

## 2021-01-09 DIAGNOSIS — R52 GENERALIZED BODY ACHES: ICD-10-CM

## 2021-01-09 DIAGNOSIS — R09.81 NASAL CONGESTION: ICD-10-CM

## 2021-01-09 DIAGNOSIS — Z20.822 EXPOSURE TO COVID-19 VIRUS: ICD-10-CM

## 2021-01-09 LAB — COVID ORDER STATUS COVID19: NORMAL

## 2021-01-10 LAB
SARS-COV-2 RNA RESP QL NAA+PROBE: NOTDETECTED
SPECIMEN SOURCE: NORMAL

## 2021-12-31 ENCOUNTER — OFFICE VISIT (OUTPATIENT)
Dept: URGENT CARE | Facility: CLINIC | Age: 36
End: 2021-12-31
Payer: COMMERCIAL

## 2021-12-31 ENCOUNTER — HOSPITAL ENCOUNTER (OUTPATIENT)
Facility: MEDICAL CENTER | Age: 36
End: 2021-12-31
Attending: FAMILY MEDICINE
Payer: COMMERCIAL

## 2021-12-31 VITALS
WEIGHT: 164 LBS | BODY MASS INDEX: 22.21 KG/M2 | OXYGEN SATURATION: 100 % | HEIGHT: 72 IN | RESPIRATION RATE: 14 BRPM | SYSTOLIC BLOOD PRESSURE: 118 MMHG | TEMPERATURE: 98.5 F | HEART RATE: 75 BPM | DIASTOLIC BLOOD PRESSURE: 78 MMHG

## 2021-12-31 DIAGNOSIS — J22 LRTI (LOWER RESPIRATORY TRACT INFECTION): ICD-10-CM

## 2021-12-31 DIAGNOSIS — R05.9 COUGH: ICD-10-CM

## 2021-12-31 DIAGNOSIS — R06.2 WHEEZING: ICD-10-CM

## 2021-12-31 LAB
EXTERNAL QUALITY CONTROL: NORMAL
SARS-COV+SARS-COV-2 AG RESP QL IA.RAPID: NEGATIVE

## 2021-12-31 PROCEDURE — U0003 INFECTIOUS AGENT DETECTION BY NUCLEIC ACID (DNA OR RNA); SEVERE ACUTE RESPIRATORY SYNDROME CORONAVIRUS 2 (SARS-COV-2) (CORONAVIRUS DISEASE [COVID-19]), AMPLIFIED PROBE TECHNIQUE, MAKING USE OF HIGH THROUGHPUT TECHNOLOGIES AS DESCRIBED BY CMS-2020-01-R: HCPCS

## 2021-12-31 PROCEDURE — U0005 INFEC AGEN DETEC AMPLI PROBE: HCPCS

## 2021-12-31 PROCEDURE — 87426 SARSCOV CORONAVIRUS AG IA: CPT | Performed by: FAMILY MEDICINE

## 2021-12-31 PROCEDURE — 99214 OFFICE O/P EST MOD 30 MIN: CPT | Performed by: FAMILY MEDICINE

## 2021-12-31 RX ORDER — AZITHROMYCIN 250 MG/1
TABLET, FILM COATED ORAL
Qty: 6 TABLET | Refills: 0 | Status: SHIPPED | OUTPATIENT
Start: 2021-12-31 | End: 2023-09-28

## 2021-12-31 RX ORDER — PREDNISONE 20 MG/1
TABLET ORAL
Qty: 10 TABLET | Refills: 0 | Status: SHIPPED | OUTPATIENT
Start: 2021-12-31 | End: 2023-09-28

## 2021-12-31 RX ORDER — ALBUTEROL SULFATE 90 UG/1
2 AEROSOL, METERED RESPIRATORY (INHALATION) EVERY 4 HOURS PRN
Qty: 1 EACH | Refills: 0 | Status: SHIPPED | OUTPATIENT
Start: 2021-12-31

## 2021-12-31 NOTE — PROGRESS NOTES
Subjective     Daniel Sebastian is a 36 y.o. male who presents with Cough (chest congestion x1 month) and Congestion (SOB, 12/6/21)            36-year-old present for evaluation of cough that he has had since January 6.  Recently increasing congestion.  He coughs a lot revaccinated breath.  Cough at times productive.  No history of asthma but has a history of reactive airways and has used albuterol before.  Is not currently on any medication.  He smokes occasional marijuana.  Review of system otherwise negative.      ROS           Objective     /78   Pulse 75   Temp 36.9 °C (98.5 °F)   Resp 14   Ht 1.829 m (6')   Wt 74.4 kg (164 lb)   SpO2 100%   BMI 22.24 kg/m²      Physical Exam  Constitutional:       General: He is not in acute distress.     Appearance: He is not ill-appearing, toxic-appearing or diaphoretic.   HENT:      Head: Atraumatic.      Right Ear: Tympanic membrane, ear canal and external ear normal.      Left Ear: Tympanic membrane, ear canal and external ear normal.      Nose: Nose normal.      Mouth/Throat:      Mouth: Mucous membranes are moist.      Pharynx: Oropharynx is clear. No oropharyngeal exudate or posterior oropharyngeal erythema.   Eyes:      Conjunctiva/sclera: Conjunctivae normal.   Cardiovascular:      Rate and Rhythm: Normal rate and regular rhythm.      Heart sounds: No murmur heard.  No friction rub. No gallop.    Pulmonary:      Effort: Prolonged expiration present. No respiratory distress.      Breath sounds: No stridor. No wheezing or rhonchi.   Musculoskeletal:      Cervical back: Neck supple.   Skin:     Coloration: Skin is not jaundiced or pale.   Neurological:      Mental Status: He is alert and oriented to person, place, and time.   Psychiatric:         Behavior: Behavior normal.                  Rapid Covid negative           Assessment & Plan      ASSESSMENT:PLAN:  1. LRTI (lower respiratory tract infection)  - azithromycin (ZITHROMAX) 250 MG Tab; 500mg on day One,  then 250mg daily until finished  Dispense: 6 Tablet; Refill: 0    2. Cough  - SARS-CoV-2 PCR (24 hour In-House): Collect NP swab in VTM; Future  - predniSONE (DELTASONE) 20 MG Tab; Take 40mg daily x 5 days  Dispense: 10 Tablet; Refill: 0  - albuterol 108 (90 Base) MCG/ACT Aero Soln inhalation aerosol; Inhale 2 Puffs every four hours as needed (wheezing).  Dispense: 1 Each; Refill: 0  - POCT SARS-COV Antigen LI (Symptomatic Only)    3. Wheezing  - predniSONE (DELTASONE) 20 MG Tab; Take 40mg daily x 5 days  Dispense: 10 Tablet; Refill: 0  - albuterol 108 (90 Base) MCG/ACT Aero Soln inhalation aerosol; Inhale 2 Puffs every four hours as needed (wheezing).  Dispense: 1 Each; Refill: 0    Recommended to stay quarantine until he gets to PCR Covid back but likely low yield.  Oral antibiotic, steroid and inhaler as directed  Follow up if not significantly improved as expected in 7-10 days, sooner if any worsening or new symptoms  Warning signs reviewed

## 2021-12-31 NOTE — PATIENT INSTRUCTIONS
Start your oral antibiotic daily as directed for 5 days  Start oral steroid daily as directed for 5 days  Use your albuterol inhaler every 4 hours as needed for wheezing  Over-the-counter medication as needed for cough  Follow up if not significantly improved as expected in 7-10 days, sooner if any worsening or new symptoms        Bronchitis  Bronchitis is a problem of the air tubes leading to your lungs. This problem makes it hard for air to get in and out of the lungs. You may cough a lot because your air tubes are narrow. Going without care can cause lasting (chronic) bronchitis.  HOME CARE   · Drink enough fluids to keep your pee (urine) clear or pale yellow.  · Use a cool mist humidifier.  · Quit smoking if you smoke. If you keep smoking, the bronchitis might not get better.  · Only take medicine as told by your doctor.  GET HELP RIGHT AWAY IF:   · Coughing keeps you awake.  · You start to wheeze.  · You become more sick or weak.  · You have a hard time breathing or get short of breath.  · You cough up blood.  · Coughing lasts more than 2 weeks.  · You have a fever.  · Your baby is older than 3 months with a rectal temperature of 102° F (38.9° C) or higher.  · Your baby is 3 months old or younger with a rectal temperature of 100.4° F (38° C) or higher.  MAKE SURE YOU:  · Understand these instructions.  · Will watch your condition.  · Will get help right away if you are not doing well or get worse.  Document Released: 06/05/2009 Document Revised: 03/11/2013 Document Reviewed: 11/19/2010  [employer]® Patient Information ©2014 [employer].    Bronchospasm, Adult  A bronchospasm is a spasm or tightening of the airways going into the lungs. During a bronchospasm breathing becomes more difficult because the airways get smaller. When this happens there can be coughing, a whistling sound when breathing (wheezing), and difficulty breathing. Bronchospasm is often associated with asthma, but not all patients who  experience a bronchospasm have asthma.  What are the causes?  A bronchospasm is caused by inflammation or irritation of the airways. The inflammation or irritation may be triggered by:  · Allergies (such as to animals, pollen, food, or mold). Allergens that cause bronchospasm may cause wheezing immediately after exposure or many hours later.  · Infection. Viral infections are believed to be the most common cause of bronchospasm.  · Exercise.  · Irritants (such as pollution, cigarette smoke, strong odors, aerosol sprays, and paint fumes).  · Weather changes. Winds increase molds and pollens in the air. Rain refreshes the air by washing irritants out. Cold air may cause inflammation.  · Stress and emotional upset.  What are the signs or symptoms?  · Wheezing.  · Excessive nighttime coughing.  · Frequent or severe coughing with a simple cold.  · Chest tightness.  · Shortness of breath.  How is this diagnosed?  Bronchospasm is usually diagnosed through a history and physical exam. Tests, such as chest X-rays, are sometimes done to look for other conditions.  How is this treated?  · Inhaled medicines can be given to open up your airways and help you breathe. The medicines can be given using either an inhaler or a nebulizer machine.  · Corticosteroid medicines may be given for severe bronchospasm, usually when it is associated with asthma.  Follow these instructions at home:  · Always have a plan prepared for seeking medical care. Know when to call your health care provider and local emergency services (911 in the [place] Know where you can access local emergency care.  · Only take medicines as directed by your health care provider.  · If you were prescribed an inhaler or nebulizer machine, ask your health care provider to explain how to use it correctly. Always use a spacer with your inhaler if you were given one.  · It is necessary to remain calm during an attack. Try to relax and breathe more slowly.  · Control your  home environment in the following ways:  ¨ Change your heating and air conditioning filter at least once a month.  ¨ Limit your use of fireplaces and wood stoves.  ¨ Do not smoke and do not allow smoking in your home.  ¨ Avoid exposure to perfumes and fragrances.  ¨ Get rid of pests (such as roaches and mice) and their droppings.  ¨ Throw away plants if you see mold on them.  ¨ Keep your house clean and dust free.  ¨ Replace carpet with wood, tile, or vinyl jorge. Carpet can trap dander and dust.  ¨ Use allergy-proof pillows, mattress covers, and box spring covers.  ¨ Wash bed sheets and blankets every week in hot water and dry them in a dryer.  ¨ Use blankets that are made of polyester or cotton.  ¨ Wash hands frequently.  Contact a health care provider if:  · You have muscle aches.  · You have chest pain.  · The sputum changes from clear or white to yellow, green, gray, or bloody.  · The sputum you cough up gets thicker.  · There are problems that may be related to the medicine you are given, such as a rash, itching, swelling, or trouble breathing.  Get help right away if:  · You have worsening wheezing and coughing even after taking your prescribed medicines.  · You have increased difficulty breathing.  · You develop severe chest pain.  This information is not intended to replace advice given to you by your health care provider. Make sure you discuss any questions you have with your health care provider.  Document Released: 12/20/2004 Document Revised: 05/25/2017 Document Reviewed: 06/09/2014  [employer] Interactive Patient Education © 2017 [employer]

## 2022-01-01 LAB
COVID ORDER STATUS COVID19: NORMAL
SARS-COV-2 RNA RESP QL NAA+PROBE: DETECTED
SPECIMEN SOURCE: ABNORMAL

## 2023-09-28 ENCOUNTER — HOSPITAL ENCOUNTER (OUTPATIENT)
Facility: MEDICAL CENTER | Age: 38
End: 2023-09-28
Attending: PHYSICIAN ASSISTANT
Payer: COMMERCIAL

## 2023-09-28 ENCOUNTER — OFFICE VISIT (OUTPATIENT)
Dept: URGENT CARE | Facility: CLINIC | Age: 38
End: 2023-09-28
Payer: COMMERCIAL

## 2023-09-28 VITALS
DIASTOLIC BLOOD PRESSURE: 70 MMHG | OXYGEN SATURATION: 99 % | BODY MASS INDEX: 24.17 KG/M2 | SYSTOLIC BLOOD PRESSURE: 120 MMHG | WEIGHT: 163.2 LBS | HEART RATE: 102 BPM | HEIGHT: 69 IN | RESPIRATION RATE: 16 BRPM | TEMPERATURE: 98.3 F

## 2023-09-28 DIAGNOSIS — J02.9 SORE THROAT: ICD-10-CM

## 2023-09-28 DIAGNOSIS — J03.90 TONSILLITIS WITH EXUDATE: ICD-10-CM

## 2023-09-28 PROCEDURE — 3074F SYST BP LT 130 MM HG: CPT | Performed by: PHYSICIAN ASSISTANT

## 2023-09-28 PROCEDURE — 87591 N.GONORRHOEAE DNA AMP PROB: CPT

## 2023-09-28 PROCEDURE — 87491 CHLMYD TRACH DNA AMP PROBE: CPT

## 2023-09-28 PROCEDURE — 3078F DIAST BP <80 MM HG: CPT | Performed by: PHYSICIAN ASSISTANT

## 2023-09-28 PROCEDURE — 99214 OFFICE O/P EST MOD 30 MIN: CPT | Performed by: PHYSICIAN ASSISTANT

## 2023-09-28 PROCEDURE — 87070 CULTURE OTHR SPECIMN AEROBIC: CPT

## 2023-09-28 RX ORDER — AZITHROMYCIN 500 MG/1
500 TABLET, FILM COATED ORAL DAILY
Qty: 5 TABLET | Refills: 0 | Status: SHIPPED | OUTPATIENT
Start: 2023-09-28 | End: 2023-10-03

## 2023-09-28 RX ORDER — DEXAMETHASONE SODIUM PHOSPHATE 10 MG/ML
10 INJECTION INTRAMUSCULAR; INTRAVENOUS ONCE
Status: COMPLETED | OUTPATIENT
Start: 2023-09-28 | End: 2023-09-28

## 2023-09-28 RX ADMIN — DEXAMETHASONE SODIUM PHOSPHATE 10 MG: 10 INJECTION INTRAMUSCULAR; INTRAVENOUS at 15:55

## 2023-09-28 ASSESSMENT — ENCOUNTER SYMPTOMS
DIZZINESS: 0
COUGH: 0
CHILLS: 1
FEVER: 0
CARDIOVASCULAR NEGATIVE: 1
SORE THROAT: 1
ABDOMINAL PAIN: 0
TROUBLE SWALLOWING: 1
NAUSEA: 0
VOMITING: 0
STRIDOR: 0
WHEEZING: 0
DIARRHEA: 0
SWOLLEN GLANDS: 1
SHORTNESS OF BREATH: 0
NECK PAIN: 1
HEADACHES: 1

## 2023-09-28 NOTE — PROGRESS NOTES
Subjective     Daniel Sebastian is a very pleasant 38 y.o. male who presents with Pharyngitis (Sore throat, difficulty swallowing and an off and on fever.  Now there's white spots and the tonsils look red and inflamed.  Previously went to the doctor with similar symptoms, took antibiotics and it's back.)            Patient has been having severe sore throat with trouble swallowing, swollen lymph nodes, swollen tonsils for the past few days.  He has been seen at the [place] urgent [facility] twice.  First visit he was given penicillin for presumptive strep without testing.  He does not report significant improvement but symptoms returned after stopping the medication.  His second visit they did test him for strep and mono which were negative but he was treated for presumptive strep with cefdinir for 10 days.  He did report improvement but the symptoms returned after stopping.  Denies STD symptoms or exposure.  No other pertinent past medical history.    Pharyngitis   This is a new problem. The current episode started 1 to 4 weeks ago. The problem has been gradually worsening. Neither side of throat is experiencing more pain than the other. There has been no fever. The fever has been present for Less than 1 day. Associated symptoms include headaches, neck pain, swollen glands and trouble swallowing. Pertinent negatives include no abdominal pain, congestion, coughing, diarrhea, ear pain, shortness of breath, stridor or vomiting. He has had no exposure to strep. Treatments tried: penicillin, cefdinir. The treatment provided moderate relief.       PMH:  has no past medical history on file.  MEDS:   Current Outpatient Medications:     albuterol 108 (90 Base) MCG/ACT Aero Soln inhalation aerosol, Inhale 2 Puffs every four hours as needed (wheezing)., Disp: 1 Each, Rfl: 0  ALLERGIES: No Known Allergies  SURGHX: No past surgical history on file.  SOCHX:  reports that he quit smoking about 8 years ago. His smoking use included  "cigarettes. He has never used smokeless tobacco. He reports current alcohol use. He reports current drug use. Frequency: 3.00 times per week. Drug: Marijuana.  FH: family history is not on file.      Review of Systems   Constitutional:  Positive for chills. Negative for fever.   HENT:  Positive for sore throat and trouble swallowing. Negative for congestion and ear pain.    Respiratory:  Negative for cough, shortness of breath, wheezing and stridor.    Cardiovascular: Negative.    Gastrointestinal:  Negative for abdominal pain, diarrhea, nausea and vomiting.   Musculoskeletal:  Positive for neck pain.   Skin:  Negative for rash.   Neurological:  Positive for headaches. Negative for dizziness.       Medications, Allergies, and current problem list reviewed today in Epic             Objective     /70 (BP Location: Right arm, Patient Position: Sitting, BP Cuff Size: Adult)   Pulse (!) 102   Temp 36.8 °C (98.3 °F) (Temporal)   Resp 16   Ht 1.753 m (5' 9\")   Wt 74 kg (163 lb 3.2 oz)   SpO2 99%   BMI 24.10 kg/m²      Physical Exam  Vitals and nursing note reviewed.   Constitutional:       General: He is not in acute distress.     Appearance: Normal appearance. He is well-developed. He is not diaphoretic.   HENT:      Head: Normocephalic and atraumatic.      Right Ear: Hearing, tympanic membrane, ear canal and external ear normal. Tympanic membrane is not erythematous.      Left Ear: Hearing, tympanic membrane, ear canal and external ear normal. Tympanic membrane is not erythematous.      Nose: Nose normal. No congestion or rhinorrhea.      Mouth/Throat:      Mouth: Mucous membranes are moist.      Dentition: Normal dentition. No dental caries.      Palate: No lesions.      Pharynx: Uvula midline. Pharyngeal swelling, oropharyngeal exudate and posterior oropharyngeal erythema present. No uvula swelling.      Tonsils: Tonsillar exudate present. No tonsillar abscesses. 3+ on the right. 3+ on the left.   Eyes:    "   General: No scleral icterus.        Right eye: No discharge.         Left eye: No discharge.      Conjunctiva/sclera: Conjunctivae normal.   Neck:      Thyroid: No thyromegaly.      Trachea: No tracheal deviation.   Cardiovascular:      Rate and Rhythm: Normal rate and regular rhythm.      Pulses: Normal pulses.      Heart sounds: Normal heart sounds. No murmur heard.  Pulmonary:      Effort: Pulmonary effort is normal. No respiratory distress.      Breath sounds: Normal breath sounds. No wheezing, rhonchi or rales.   Musculoskeletal:      Cervical back: Normal range of motion and neck supple. No rigidity.   Lymphadenopathy:      Head:      Right side of head: Submandibular adenopathy present.      Left side of head: Submandibular adenopathy present.      Cervical: Cervical adenopathy present.      Right cervical: No superficial cervical adenopathy.     Left cervical: No superficial cervical adenopathy.   Skin:     General: Skin is warm and dry.      Nails: There is no clubbing.   Neurological:      General: No focal deficit present.      Mental Status: He is alert and oriented to person, place, and time. Mental status is at baseline.   Psychiatric:         Mood and Affect: Mood normal.         Behavior: Behavior normal.         Thought Content: Thought content normal.         Judgment: Judgment normal.                             Assessment & Plan     This is a very pleasant 38-year-old male presenting with sore throat, swollen lymph nodes, swollen tonsils and trouble swallowing.  He has been seen twice at a separate urgent care.  He was treated with penicillin initially and then cefdinir.  He reports improvement in symptoms on both antibiotics but the symptoms returned after finishing both times.  He had strep and mono testing completed which were negative.  He denies fever, chills, bodies.  Denies shortness of breath, wheezing, congestion or cough.  Denies STD exposure or symptoms.  No other pertinent past  medical history.  Vital signs normal.  Exam shows posterior pharynx erythema and edema with 3+ tonsils bilaterally.  Copious erythema and exudate noted.  Uvula is midline and there is no sign of peritonsillar abscess.  No stridor, tripoding or drooling.  Patient is tolerating oral secretions.  Regional adenopathy noted.  Respiratory exam benign.  In clinic strep and mono testing negative.  We will initiate throat culture and gonorrhea/chlamydia oral swab.  He was given 1 g IM injection of Rocephin in clinic and 10 mg oral Decadron with no adverse reaction.  He will be treated with Zithromax to cover for atypical infection.  Unclear etiology but we will call patient with results and treat/refer accordingly pending results.    1. Sore throat  POCT CEPHEID GROUP A STREP - PCR    POCT Mononucleosis (mono)      2. Tonsillitis with exudate  CULTURE THROAT    dexamethasone (Decadron) injection (check route below) 10 mg    azithromycin (ZITHROMAX) 500 MG tablet    Chlamydia & N. Gonorrhoeae By PCR    cefTRIAXone (Rocephin) 500 mg in lidocaine (Xylocaine) 1 % 2 mL for IM use    cefTRIAXone (Rocephin) 500 mg in lidocaine (Xylocaine) 1 % 2 mL for IM use    DISCONTINUED: cefTRIAXone (Rocephin) 1 g in lidocaine (Xylocaine) 1 % 4 mL for IM use          I personally reviewed prior external notes and test results pertinent to today's visit. Return to clinic or go to ED if symptoms worsen or persist. Red flag symptoms and indications for ED discussed at length. Patient/Parent/Guardian voices understanding.  AVS with post-visit instructions provided or given verbally.  Follow-up with your primary care provider in 3-5 days. All side effects and potential interactions of prescribed medication discussed including allergic response, GI upset, tendon injury, rash, sedation, OCP effectiveness, etc.    Please note that this dictation was created using voice recognition software. I have made every reasonable attempt to correct obvious  errors, but I expect that there are errors of grammar and possibly content that I did not discover before finalizing the note.  '

## 2023-09-29 LAB
C TRACH DNA GENITAL QL NAA+PROBE: NEGATIVE
N GONORRHOEA DNA GENITAL QL NAA+PROBE: NEGATIVE
SPECIMEN SOURCE: NORMAL

## 2023-09-30 LAB
BACTERIA SPEC RESP CULT: NORMAL
SIGNIFICANT IND 70042: NORMAL
SITE SITE: NORMAL
SOURCE SOURCE: NORMAL

## 2023-10-03 DIAGNOSIS — J03.90 TONSILLITIS WITH EXUDATE: ICD-10-CM

## 2024-02-20 ENCOUNTER — HOSPITAL ENCOUNTER (OUTPATIENT)
Facility: MEDICAL CENTER | Age: 39
End: 2024-02-20
Attending: PHYSICIAN ASSISTANT
Payer: COMMERCIAL

## 2024-02-20 ENCOUNTER — OFFICE VISIT (OUTPATIENT)
Dept: URGENT CARE | Facility: CLINIC | Age: 39
End: 2024-02-20
Payer: COMMERCIAL

## 2024-02-20 VITALS
HEART RATE: 89 BPM | TEMPERATURE: 99.2 F | RESPIRATION RATE: 15 BRPM | SYSTOLIC BLOOD PRESSURE: 118 MMHG | WEIGHT: 169.8 LBS | BODY MASS INDEX: 25.15 KG/M2 | DIASTOLIC BLOOD PRESSURE: 70 MMHG | HEIGHT: 69 IN | OXYGEN SATURATION: 96 %

## 2024-02-20 DIAGNOSIS — J03.90 EXUDATIVE TONSILLITIS: ICD-10-CM

## 2024-02-20 LAB — S PYO DNA SPEC NAA+PROBE: NOT DETECTED

## 2024-02-20 PROCEDURE — 3078F DIAST BP <80 MM HG: CPT | Performed by: PHYSICIAN ASSISTANT

## 2024-02-20 PROCEDURE — 99213 OFFICE O/P EST LOW 20 MIN: CPT | Performed by: PHYSICIAN ASSISTANT

## 2024-02-20 PROCEDURE — 3074F SYST BP LT 130 MM HG: CPT | Performed by: PHYSICIAN ASSISTANT

## 2024-02-20 PROCEDURE — 87077 CULTURE AEROBIC IDENTIFY: CPT

## 2024-02-20 PROCEDURE — 87070 CULTURE OTHR SPECIMN AEROBIC: CPT

## 2024-02-20 PROCEDURE — 87651 STREP A DNA AMP PROBE: CPT | Performed by: PHYSICIAN ASSISTANT

## 2024-02-20 RX ORDER — ACETAMINOPHEN 500 MG
500-1000 TABLET ORAL EVERY 6 HOURS PRN
COMMUNITY

## 2024-02-20 RX ORDER — DEXAMETHASONE SODIUM PHOSPHATE 10 MG/ML
10 INJECTION INTRAMUSCULAR; INTRAVENOUS ONCE
Status: COMPLETED | OUTPATIENT
Start: 2024-02-20 | End: 2024-02-20

## 2024-02-20 RX ORDER — AMOXICILLIN AND CLAVULANATE POTASSIUM 875; 125 MG/1; MG/1
1 TABLET, FILM COATED ORAL 2 TIMES DAILY
Qty: 20 TABLET | Refills: 0 | Status: SHIPPED | OUTPATIENT
Start: 2024-02-20 | End: 2024-03-01

## 2024-02-20 RX ADMIN — DEXAMETHASONE SODIUM PHOSPHATE 10 MG: 10 INJECTION INTRAMUSCULAR; INTRAVENOUS at 10:29

## 2024-02-20 ASSESSMENT — ENCOUNTER SYMPTOMS
NECK PAIN: 0
ABDOMINAL PAIN: 0
COUGH: 0
SINUS PAIN: 0
VOMITING: 0
SPUTUM PRODUCTION: 0
WHEEZING: 0
DIAPHORESIS: 0
CHILLS: 0
DIZZINESS: 0
DIARRHEA: 0
MYALGIAS: 1
SHORTNESS OF BREATH: 0
HEADACHES: 0
NAUSEA: 0
SORE THROAT: 1
PALPITATIONS: 0
FEVER: 1

## 2024-02-20 NOTE — PROGRESS NOTES
Subjective:     CHIEF COMPLAINT  Chief Complaint   Patient presents with    Pharyngitis     Patient states a sharp stabbing pain on his throat. Patient states it started on Saturday. States his throat looked similar to the last time he was here in office.     Body Aches       HPI  Vick Sebastian is a very pleasant 38 y.o. male who presents to the clinic with a sore throat and swollen tonsils x 4 days.  Describes moderate pain when swallowing.  No difficulty swallowing or handling secretions.  States he had a similar episode to this in September.  I reviewed his notes from that visit.  At that visit strep testing returned negative as did mono.  He had a throat culture preformed that also returned negative.  He initially failed a course of Augmentin and then was subsequently treated with a course of Rocephin and azithromycin.  Symptoms resolved after a couple weeks.  Believes he may be running low-grade fevers intermittently over the last few days.  Experiencing mild body aches.  No cough.    REVIEW OF SYSTEMS  Review of Systems   Constitutional:  Positive for fever. Negative for chills, diaphoresis and malaise/fatigue.   HENT:  Positive for sore throat. Negative for congestion, ear pain and sinus pain.    Respiratory:  Negative for cough, sputum production, shortness of breath and wheezing.    Cardiovascular:  Negative for chest pain and palpitations.   Gastrointestinal:  Negative for abdominal pain, diarrhea, nausea and vomiting.   Musculoskeletal:  Positive for myalgias. Negative for neck pain.   Neurological:  Negative for dizziness and headaches.       PAST MEDICAL HISTORY  There are no problems to display for this patient.      SURGICAL HISTORY  patient denies any surgical history    ALLERGIES  No Known Allergies    CURRENT MEDICATIONS  Home Medications       Reviewed by Enrique Rincon P.A.-C. (Physician Assistant) on 02/20/24 at 1010  Med List Status: <None>     Medication Last Dose Status  "  acetaminophen (TYLENOL) 500 MG Tab Taking Active   albuterol 108 (90 Base) MCG/ACT Aero Soln inhalation aerosol PRN Active                    SOCIAL HISTORY  Social History     Tobacco Use    Smoking status: Former     Current packs/day: 0.00     Types: Cigarettes     Quit date: 2015     Years since quittin.1    Smokeless tobacco: Never   Substance and Sexual Activity    Alcohol use: Yes     Comment: social    Drug use: Yes     Frequency: 3.0 times per week     Types: Marijuana     Comment: moderate marijuana     Sexual activity: Yes     Birth control/protection: Condom     Comment: normally he would use protection        FAMILY HISTORY  History reviewed. No pertinent family history.       Objective:     VITAL SIGNS: /70 (BP Location: Left arm, Patient Position: Sitting, BP Cuff Size: Adult)   Pulse 89   Temp 37.3 °C (99.2 °F) (Temporal)   Resp 15   Ht 1.753 m (5' 9\")   Wt 77 kg (169 lb 12.8 oz)   SpO2 96%   BMI 25.08 kg/m²     PHYSICAL EXAM  Physical Exam  Constitutional:       General: He is not in acute distress.     Appearance: Normal appearance. He is not ill-appearing, toxic-appearing or diaphoretic.   HENT:      Head: Normocephalic and atraumatic.      Right Ear: Tympanic membrane, ear canal and external ear normal.      Left Ear: Tympanic membrane, ear canal and external ear normal.      Nose: Congestion present. No rhinorrhea.      Mouth/Throat:      Mouth: Mucous membranes are moist.      Pharynx: Oropharyngeal exudate and posterior oropharyngeal erythema present.      Comments: Posterior oropharynx erythematous.  3+ tonsillar edema with diffuse exudate bilaterally.  Midline uvula without deviation.  No cellulitic change or signs of peritonsillar abscess.  Eyes:      Conjunctiva/sclera: Conjunctivae normal.   Cardiovascular:      Rate and Rhythm: Normal rate and regular rhythm.      Pulses: Normal pulses.      Heart sounds: Normal heart sounds.   Pulmonary:      Effort: Pulmonary " effort is normal.      Breath sounds: Normal breath sounds. No wheezing, rhonchi or rales.   Musculoskeletal:      Cervical back: Normal range of motion. No muscular tenderness.   Lymphadenopathy:      Cervical: Cervical adenopathy (Bilateral anterior tender cervical adenopathy.) present.   Skin:     General: Skin is warm and dry.   Neurological:      Mental Status: He is alert.   Psychiatric:         Mood and Affect: Mood normal.         Thought Content: Thought content normal.         Assessment/Plan:     1. Exudative tonsillitis  POCT GROUP A STREP, PCR    CULTURE THROAT    dexamethasone (Decadron) injection (check route below) 10 mg    amoxicillin-clavulanate (AUGMENTIN) 875-125 MG Tab          MDM/Comments:    Rapid strep in clinic returned negative.  We elected to begin empiric antibiotics based on appearance.  Currently has 3+ tonsillar edema with diffuse exudate.  10 mg oral Decadron provided in clinic.  Will start a 10-day course of Augmentin.  Throat culture sent out and I will follow-up and adjust treatment if necessary.  Return precautions discussed for any worsening symptoms.    Differential diagnosis, natural history, supportive care, and indications for immediate follow-up discussed. All questions answered. Patient agrees with the plan of care.    Follow-up as needed if symptoms worsen or fail to improve to PCP, Urgent care or Emergency Room.    I have personally reviewed prior external notes and test results pertinent to today's visit.  I have independently reviewed and interpreted all diagnostics ordered during this urgent care acute visit.   Discussed management options (risks,benefits, and alternatives to treatment). Pt expresses understanding and the treatment plan was agreed upon. Questions were encouraged and answered to pt's satisfaction.    Please note that this dictation was created using voice recognition software. I have made a reasonable attempt to correct obvious errors, but I expect  that there are errors of grammar and possibly content that I did not discover before finalizing the note.

## 2024-06-12 ENCOUNTER — OFFICE VISIT (OUTPATIENT)
Dept: MEDICAL GROUP | Facility: MEDICAL CENTER | Age: 39
End: 2024-06-12
Attending: PHYSICIAN ASSISTANT
Payer: COMMERCIAL

## 2024-06-12 VITALS
SYSTOLIC BLOOD PRESSURE: 124 MMHG | HEIGHT: 69 IN | TEMPERATURE: 96.7 F | DIASTOLIC BLOOD PRESSURE: 68 MMHG | OXYGEN SATURATION: 93 % | HEART RATE: 69 BPM | WEIGHT: 181.3 LBS | BODY MASS INDEX: 26.85 KG/M2

## 2024-06-12 DIAGNOSIS — Z11.59 NEED FOR HEPATITIS C SCREENING TEST: ICD-10-CM

## 2024-06-12 DIAGNOSIS — Z00.00 WELLNESS EXAMINATION: Primary | ICD-10-CM

## 2024-06-12 DIAGNOSIS — Z11.4 SCREENING FOR HIV WITHOUT PRESENCE OF RISK FACTORS: ICD-10-CM

## 2024-06-12 PROCEDURE — 3074F SYST BP LT 130 MM HG: CPT | Performed by: STUDENT IN AN ORGANIZED HEALTH CARE EDUCATION/TRAINING PROGRAM

## 2024-06-12 PROCEDURE — 3078F DIAST BP <80 MM HG: CPT | Performed by: STUDENT IN AN ORGANIZED HEALTH CARE EDUCATION/TRAINING PROGRAM

## 2024-06-12 PROCEDURE — 99395 PREV VISIT EST AGE 18-39: CPT | Performed by: STUDENT IN AN ORGANIZED HEALTH CARE EDUCATION/TRAINING PROGRAM

## 2024-06-12 ASSESSMENT — PATIENT HEALTH QUESTIONNAIRE - PHQ9: CLINICAL INTERPRETATION OF PHQ2 SCORE: 0

## 2024-06-12 NOTE — PROGRESS NOTES
Subjective:     CC:   Chief Complaint   Patient presents with    [facility]    Annual Exam       HPI:   Vick Sebastian is a 38 y.o. male who presents to [facility] and annual exam. No chronic medical problems .     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  He  has no past surgical history on file.    Family History   Problem Relation Age of Onset    Diabetes Mother     Hypertension Father     Diabetes Maternal Grandmother      Social History     Tobacco Use    Smoking status: Former     Current packs/day: 0.00     Types: Cigarettes     Quit date: 2015     Years since quittin.4    Smokeless tobacco: Never   Substance Use Topics    Alcohol use: Yes     Comment: social    Drug use: Yes     Frequency: 3.0 times per week     Types: Marijuana     Comment: moderate marijuana      He  reports being sexually active. He reports using the following method of birth control/protection: Condom.    Patient Active Problem List    Diagnosis Date Noted    Wellness examination 2024    BMI 26.0-26.9,adult 2024     Current Outpatient Medications   Medication Sig Dispense Refill    acetaminophen (TYLENOL) 500 MG Tab Take 500-1,000 mg by mouth every 6 hours as needed.      albuterol 108 (90 Base) MCG/ACT Aero Soln inhalation aerosol Inhale 2 Puffs every four hours as needed (wheezing). (Patient not taking: Reported on 2024) 1 Each 0     No current facility-administered medications for this visit.     No Known Allergies    Review of Systems   Constitutional: Negative for fever, chills and malaise/fatigue.   HENT: Negative for congestion.    Eyes: Negative for  "pain.   Respiratory: Negative for cough and shortness of breath.    Cardiovascular: Negative for chest pain and leg swelling.   Gastrointestinal: Negative for nausea, vomiting, abdominal pain and diarrhea.   Genitourinary: Negative for dysuria and hematuria.   Skin: Negative for rash.   Neurological: Negative for dizziness, focal weakness and headaches.   Endo/Heme/Allergies: Does not bruise/bleed easily.   Psychiatric/Behavioral: Negative for depression.  The patient is not nervous/anxious.      Objective:   /68 (BP Location: Left arm, Patient Position: Sitting, BP Cuff Size: Adult)   Pulse 69   Temp 35.9 °C (96.7 °F) (Temporal)   Ht 1.753 m (5' 9\")   Wt 82.2 kg (181 lb 4.8 oz)   SpO2 93%   BMI 26.77 kg/m²      Wt Readings from Last 4 Encounters:   06/12/24 82.2 kg (181 lb 4.8 oz)   02/20/24 77 kg (169 lb 12.8 oz)   09/28/23 74 kg (163 lb 3.2 oz)   12/31/21 74.4 kg (164 lb)       Physical Exam:  Constitutional: Well-developed and well-nourished. Not diaphoretic. No distress.   Skin: Skin is warm and dry. No rash noted.  Head: Atraumatic without lesions.  Eyes: Conjunctivae and extraocular motions are normal. Pupils are equal, round, and reactive to light. No scleral icterus.   Ears:  External ears unremarkable. Tympanic membranes clear and intact.  Nose: Nares patent. Septum midline. Turbinates without erythema nor edema. No discharge.   Mouth/Throat: Tongue normal. Oropharynx is clear and moist. Posterior pharynx without erythema or exudates.  Neck: Supple, trachea midline. Normal range of motion. No thyromegaly present. No lymphadenopathy--cervical or supraclavicular.  Cardiovascular: Regular rate and rhythm, S1 and S2 without murmur, rubs, or gallops.    Respiratory: Effort normal. Clear to auscultation throughout. No adventitious sounds.   Abdomen: Soft, non tender, and without distention. Active bowel sounds in all four quadrants. No rebound, guarding, masses or HSM.  Extremities: No cyanosis, " clubbing, erythema, nor edema. Distal pulses intact and symmetric.   Musculoskeletal: All major joints AROM full in all directions without pain.  Neurological: Alert and oriented x 3. Grossly non-focal. Strength and sensation grossly intact. DTRs 2+/3 and symmetric.   Psychiatric:  Behavior, mood, and affect are appropriate.      Assessment and Plan:     1. Wellness examination  - Comp Metabolic Panel; Future  - Lipid Profile; Future  - CBC WITHOUT DIFFERENTIAL; Future  - TSH WITH REFLEX TO FT4; Future  - HEMOGLOBIN A1C; Future    2. BMI 26.0-26.9,adult  - HEMOGLOBIN A1C; Future    3. Need for hepatitis C screening test  - HEP C VIRUS ANTIBODY; Future    4. Screening for HIV without presence of risk factors  - HIV AG/AB COMBO ASSAY SCREENING; Future    Health maintenance:     Discussed:  regular exercise   healthy diet  Sun protection w SPF  Biannual dentist visit recommended , going to see dentist next month   Substance Abuse: marijuana   Safe in relationship. Yes  Seat belts, bike helmet, gun safety discussed.  Last lab results were reviewed by me today        Labs per orders  Immunizations per orders  Patient counseled about skin care, diet, supplements, and exercise.  Discussed diet and exercise and STD prevention     Follow-up: Return in about 1 year (around 6/12/2025), or if symptoms worsen or fail to improve.

## 2025-08-07 ENCOUNTER — NON-PROVIDER VISIT (OUTPATIENT)
Dept: OCCUPATIONAL MEDICINE | Facility: CLINIC | Age: 40
End: 2025-08-07

## 2025-08-07 DIAGNOSIS — Z11.1 ENCOUNTER FOR PPD TEST: Primary | ICD-10-CM

## 2025-08-07 PROCEDURE — 86580 TB INTRADERMAL TEST: CPT | Performed by: NURSE PRACTITIONER

## 2025-08-10 ENCOUNTER — NON-PROVIDER VISIT (OUTPATIENT)
Dept: URGENT CARE | Facility: CLINIC | Age: 40
End: 2025-08-10
Payer: COMMERCIAL

## 2025-08-10 LAB — TB WHEAL 3D P 5 TU DIAM: 0 MM
